# Patient Record
Sex: MALE | Race: WHITE | Employment: OTHER | URBAN - METROPOLITAN AREA
[De-identification: names, ages, dates, MRNs, and addresses within clinical notes are randomized per-mention and may not be internally consistent; named-entity substitution may affect disease eponyms.]

---

## 2023-05-20 ENCOUNTER — APPOINTMENT (OUTPATIENT)
Dept: GENERAL RADIOLOGY | Age: 73
End: 2023-05-20
Attending: PHYSICIAN ASSISTANT
Payer: MEDICARE

## 2023-05-20 ENCOUNTER — HOSPITAL ENCOUNTER (OUTPATIENT)
Age: 73
Discharge: HOME | End: 2023-05-20
Payer: MEDICARE

## 2023-05-20 VITALS
WEIGHT: 210 LBS | BODY MASS INDEX: 26.11 KG/M2 | SYSTOLIC BLOOD PRESSURE: 124 MMHG | TEMPERATURE: 98 F | RESPIRATION RATE: 20 BRPM | HEART RATE: 72 BPM | DIASTOLIC BLOOD PRESSURE: 68 MMHG | OXYGEN SATURATION: 98 % | HEIGHT: 75 IN

## 2023-05-20 DIAGNOSIS — L08.9 FINGER INFECTION: Primary | ICD-10-CM

## 2023-05-20 PROCEDURE — 99204 OFFICE O/P NEW MOD 45 MIN: CPT

## 2023-05-20 PROCEDURE — 99203 OFFICE O/P NEW LOW 30 MIN: CPT

## 2023-05-20 PROCEDURE — 73140 X-RAY EXAM OF FINGER(S): CPT | Performed by: PHYSICIAN ASSISTANT

## 2023-05-20 RX ORDER — BUPROPION HYDROCHLORIDE 300 MG/1
300 TABLET ORAL DAILY
COMMUNITY
Start: 2023-05-01

## 2023-05-20 RX ORDER — CEPHALEXIN 500 MG/1
500 CAPSULE ORAL 2 TIMES DAILY
Qty: 20 CAPSULE | Refills: 0 | Status: SHIPPED | OUTPATIENT
Start: 2023-05-20 | End: 2023-05-30

## 2023-05-20 RX ORDER — LOSARTAN POTASSIUM 50 MG/1
TABLET ORAL DAILY
COMMUNITY

## 2023-05-20 RX ORDER — ASPIRIN 81 MG/1
81 TABLET ORAL
COMMUNITY

## 2023-05-20 RX ORDER — ATORVASTATIN CALCIUM 80 MG/1
80 TABLET, FILM COATED ORAL NIGHTLY
COMMUNITY
Start: 2023-05-01

## 2023-05-20 NOTE — DISCHARGE INSTRUCTIONS
Please return to the ER/clinic if symptoms worsen. Follow-up with your PCP in 24-48 hours as needed. Gently cleanse your hands with a good antibacterial soap. Use a good moisturizer i.e. Junnie Perone for working man hands. Apply the mupirocin liberally to the base of the nailbed. Take the full course of antibiotics as prescribed in tandem with a probiotic daily. Look for any continuing signs and symptoms of infection: redness, swelling, streaking, drainage or fevers.

## 2023-05-20 NOTE — ED INITIAL ASSESSMENT (HPI)
Pt got a splinter or thorn about 10 days ago in his rt middle finger while in  [place].   Now red and swollen and painful

## 2025-06-17 ENCOUNTER — HOSPITAL ENCOUNTER (OUTPATIENT)
Age: 75
Discharge: HOME | End: 2025-06-17
Payer: MEDICARE

## 2025-06-17 VITALS
RESPIRATION RATE: 18 BRPM | WEIGHT: 210 LBS | SYSTOLIC BLOOD PRESSURE: 146 MMHG | HEIGHT: 74 IN | DIASTOLIC BLOOD PRESSURE: 82 MMHG | BODY MASS INDEX: 26.95 KG/M2 | TEMPERATURE: 97 F | OXYGEN SATURATION: 97 % | HEART RATE: 65 BPM

## 2025-06-17 DIAGNOSIS — M54.50 ACUTE BILATERAL LOW BACK PAIN WITHOUT SCIATICA: Primary | ICD-10-CM

## 2025-06-17 PROCEDURE — 99213 OFFICE O/P EST LOW 20 MIN: CPT

## 2025-06-17 RX ORDER — LATANOPROST 50 UG/ML
1 SOLUTION/ DROPS OPHTHALMIC NIGHTLY
COMMUNITY

## 2025-06-17 RX ORDER — METHYLPREDNISOLONE 4 MG/1
TABLET ORAL
Qty: 1 EACH | Refills: 0 | Status: SHIPPED | OUTPATIENT
Start: 2025-06-17

## 2025-06-17 RX ORDER — OMEPRAZOLE 40 MG/1
40 CAPSULE, DELAYED RELEASE ORAL
COMMUNITY

## 2025-06-17 RX ORDER — CYCLOBENZAPRINE HCL 10 MG
10 TABLET ORAL 3 TIMES DAILY PRN
Qty: 20 TABLET | Refills: 0 | Status: SHIPPED | OUTPATIENT
Start: 2025-06-17 | End: 2025-06-24

## 2025-06-17 RX ORDER — EZETIMIBE 10 MG/1
10 TABLET ORAL DAILY
COMMUNITY
Start: 2025-05-12

## 2025-06-17 RX ORDER — HYDROXYCHLOROQUINE SULFATE 200 MG/1
200 TABLET, FILM COATED ORAL 2 TIMES DAILY WITH MEALS
COMMUNITY
Start: 2023-07-19

## 2025-06-17 NOTE — DISCHARGE INSTRUCTIONS
Please take Medrol Dosepak as prescribed.  You may also take muscle relaxers.  Be advised this medication can make you dizzy and drowsy.  No drinking alcohol or driving.  Gentle stretching.  Rotating ice and heat.  Warm Epsom salt soaks.  Over-the-counter lidocaine or Salonpas patches may help.  Close follow-up with your primary if symptoms continue as you may require an MRI and/or physical therapy.  ER precautions as discussed.

## 2025-06-17 NOTE — ED PROVIDER NOTES
Patient Seen in: [facility]        History  Chief Complaint   Patient presents with    Back Pain     Stated Complaint: Sore Back    Subjective:   This is a 74-year-old male with below stated medical history.  Presents to immediate care for low back pain.  Symptoms started on Sunday.  Recently returned from a 30-hour drive from [place].  No trauma or injury.  No numbness, tingling, or weakness in the lower legs.  No chest pain, shortness of breath or swelling in the lower legs.  No urinary symptoms or hematuria.  Taking Tylenol and Advil with some relief.  Pain is worse when changing positions and better when sitting.    The history is provided by the patient.                     Objective:     Past Medical History:    Atherosclerosis of coronary artery    Cancer (HCC)    Depression    Essential hypertension    Hyperlipidemia              Past Surgical History:   Procedure Laterality Date    Cabg      Shoulder arthroplasty                  Social History     Socioeconomic History    Marital status:    Tobacco Use    Smoking status: Never     Passive exposure: Never    Smokeless tobacco: Never   Substance and Sexual Activity    Alcohol use: Not Currently    Drug use: Never     Social Drivers of Health      Received from [facility]    Living Situation              Review of Systems   Constitutional:  Negative for chills and fever.   HENT:  Negative for congestion and sore throat.    Respiratory:  Negative for cough, shortness of breath and wheezing.    Cardiovascular:  Negative for chest pain, palpitations and leg swelling.   Gastrointestinal:  Negative for abdominal pain.   Genitourinary:  Negative for dysuria, flank pain and hematuria.   Musculoskeletal:  Positive for back pain. Negative for neck pain and neck stiffness.   Skin:  Negative for rash.   Neurological:  Negative for dizziness, weakness and numbness.       Positive for stated complaint: Sore Back  Other systems are as noted in  HPI.  Constitutional and vital signs reviewed.      All other systems reviewed and negative except as noted above.                  Physical Exam    ED Triage Vitals [06/17/25 1113]   /82   Pulse 65   Resp 18   Temp 97.2 °F (36.2 °C)   Temp src Oral   SpO2 97 %   O2 Device None (Room air)       Current Vitals:   Vital Signs  BP: 146/82  Pulse: 65  Resp: 18  Temp: 97.2 °F (36.2 °C)  Temp src: Oral    Oxygen Therapy  SpO2: 97 %  O2 Device: None (Room air)            Physical Exam  Vitals and nursing note reviewed.   Constitutional:       General: He is not in acute distress.     Appearance: Normal appearance. He is not ill-appearing, toxic-appearing or diaphoretic.   HENT:      Head: Normocephalic and atraumatic.      Right Ear: External ear normal.      Left Ear: External ear normal.      Nose: Nose normal.      Mouth/Throat:      Mouth: Mucous membranes are moist.      Pharynx: Oropharynx is clear.   Eyes:      General:         Right eye: No discharge.         Left eye: No discharge.      Extraocular Movements: Extraocular movements intact.      Conjunctiva/sclera: Conjunctivae normal.   Cardiovascular:      Rate and Rhythm: Normal rate.   Pulmonary:      Effort: Pulmonary effort is normal.   Musculoskeletal:      Cervical back: Normal and neck supple.      Thoracic back: Normal.      Lumbar back: Tenderness present. No swelling, edema, deformity, signs of trauma, lacerations, spasms or bony tenderness. Decreased range of motion. No scoliosis.      Right lower leg: No edema.      Left lower leg: No edema.      Comments: No midline spinous process tenderness, no step-offs, no saddle anesthesia.   Skin:     General: Skin is warm and dry.      Capillary Refill: Capillary refill takes less than 2 seconds.      Findings: No rash.   Neurological:      Mental Status: He is alert and oriented to person, place, and time.   Psychiatric:         Mood and Affect: Mood normal.         Behavior: Behavior normal.                  ED Course  Labs Reviewed - No data to display       DC home.                   MDM     Vital signs stable.  Patient is well-appearing and nontoxic looking.  Presents to immediate care with nontraumatic back pain.    Differential diagnosis include but is not limited to muscle strain, degenerative disc disease, herniated disc, sciatica, lumbar radiculopathy, obstructive uropathy    No back pain red flags.  Patient reports no urinary symptoms.  Pain is 100% reproducible with changing positions.    Clinical impression is musculoskeletal back pain    DC home.  Rx given for Medrol Dosepak and Flexeril.  Advised Flexeril can cause dizziness and drowsiness.   No drinking alcohol or driving motor vehicles while taking this medication.   Recommended over-the-counter topical pain patches.  We discussed close follow-up with PCP.  Patient may require outpatient physical therapy and/or other outpatient imaging.  Reasons to seek emergent care and reevaluation reviewed.  Patient verbalized understanding, and agreed with plan of care.  All questions answered.           Medical Decision Making      Disposition and Plan     Clinical Impression:  1. Acute bilateral low back pain without sciatica         Disposition:  Discharge  6/17/2025 12:11 pm    Follow-up:  Your PMD    In 1 week            Medications Prescribed:  Current Discharge Medication List        START taking these medications    Details   methylPREDNISolone (MEDROL) 4 MG Oral Tablet Therapy Pack Dosepack: take as directed  Qty: 1 each, Refills: 0      cyclobenzaprine 10 MG Oral Tab Take 1 tablet (10 mg total) by mouth 3 (three) times daily as needed for Muscle spasms.  Qty: 20 tablet, Refills: 0                   Supplementary Documentation:

## 2025-06-17 NOTE — ED QUICK NOTES
Reviewed discharge information with patient. Patient verbalized understanding, no further questions or complaints at this time. Patient is alert and orientated x4, in no apparent distress, ambulating with steady gait. No IV in. Instructed patient to go to ED for any new or worsening symptoms. Patient instructed to not drive for 8 hours after taking sedating medication. Patient verbalized understanding.

## 2025-06-17 NOTE — ED INITIAL ASSESSMENT (HPI)
Middle lower back pain starting on Sunday. States that he was driving 30 hours from [place]. Denies saddle anesthesia, loss of function of bowel or bladder, numbness/tingling.